# Patient Record
Sex: FEMALE | Race: BLACK OR AFRICAN AMERICAN | Employment: FULL TIME | URBAN - METROPOLITAN AREA
[De-identification: names, ages, dates, MRNs, and addresses within clinical notes are randomized per-mention and may not be internally consistent; named-entity substitution may affect disease eponyms.]

---

## 2022-08-28 ENCOUNTER — HOSPITAL ENCOUNTER (EMERGENCY)
Age: 47
Discharge: HOME | End: 2022-08-28
Attending: EMERGENCY MEDICINE | Admitting: EMERGENCY MEDICINE
Payer: MEDICAID

## 2022-08-28 VITALS
SYSTOLIC BLOOD PRESSURE: 120 MMHG | HEIGHT: 69 IN | OXYGEN SATURATION: 99 % | RESPIRATION RATE: 18 BRPM | WEIGHT: 150 LBS | TEMPERATURE: 98 F | DIASTOLIC BLOOD PRESSURE: 74 MMHG | BODY MASS INDEX: 22.22 KG/M2 | HEART RATE: 75 BPM

## 2022-08-28 DIAGNOSIS — Z20.2 POSSIBLE EXPOSURE TO STD: Primary | ICD-10-CM

## 2022-08-28 PROCEDURE — 99283 EMERGENCY DEPT VISIT LOW MDM: CPT | Performed by: EMERGENCY MEDICINE

## 2022-08-28 PROCEDURE — 87591 N.GONORRHOEAE DNA AMP PROB: CPT

## 2022-08-28 ASSESSMENT — ENCOUNTER SYMPTOMS
SHORTNESS OF BREATH: 0
ABDOMINAL PAIN: 0

## 2022-08-28 ASSESSMENT — PAIN - FUNCTIONAL ASSESSMENT: PAIN_FUNCTIONAL_ASSESSMENT: NONE - DENIES PAIN

## 2022-08-28 NOTE — ED PROVIDER NOTES
[facility] Emergency Department Provider Note                   PCP:                None Provider               Age: 56 y.o. Sex: female       ICD-10-CM    1. Possible exposure to STD  Z20.2           DISPOSITION Decision To Discharge 08/28/2022 04:24:20 PM        MDM  Number of Diagnoses or Management Options  Possible exposure to STD: new, needed workup  Diagnosis management comments: Well-appearing 79-year-old female who presents to the emergency department today due to concern for possible STD. She states that she had a bump on her genital region but is now resolved. Discussed the possibility of syphilis as she states it was painless. Patient states she does not believe it was syphilis. She is agreeable to have her urine sent for gonorrhea and chlamydia today but denies any current symptoms. She states she was concerned for monkey pox. Discussed the signs and symptoms of monkey pox. Patient had no prodrome of symptoms. She states the bump that she had on her genital region is resolved at this time. Encouraged follow-up with the [facility] for testing of other STDs if she has further concerns. She did not wish to be treated for anything today. I have discussed the results of all labs, procedures, radiographs, and/or treatments with the patient and available family members. Treatment plan is agreed upon by the patient and the patient is ready for discharge. Questions about treatment in the ED and differential diagnosis of presenting condition were answered. Patient was given verbal discharge instructions including, but not limited to, importance of returning to the emergency department for any concern of worsening or continued symptoms.          Amount and/or Complexity of Data Reviewed  Clinical lab tests: ordered    Risk of Complications, Morbidity, and/or Mortality  Presenting problems: low  Diagnostic procedures: low  Management options: low    Patient Progress  Patient progress: improved       Orders Placed This Encounter   Procedures    C.trachomatis N.gonorrhoeae DNA        Medications - No data to display    There are no discharge medications for this patient. Caron Arias is a 56 y.o. female who presents to the Emergency Department with chief complaint of    Chief Complaint   Patient presents with    Cough     Patient presents complaining of cough, congestion, post-nasal drainage and rash (rash is resolved). Patient verbalizes concerns for monkey pox. No rash present at this time. 31-year-old female who presents emergency department today with complaint of a rash on her neck that has since resolved. She also states she had developed a sore in her pubic hair but it is now resolved as well. She denies any vaginal discharge, vaginal bleeding, dysuria, hematuria, fever, chills, pelvic pain, abdominal pain, nausea, vomiting, diarrhea, chest pain, or shortness of breath today. The history is provided by the patient. Review of Systems   Constitutional:  Negative for fever. Respiratory:  Negative for shortness of breath. Cardiovascular:  Negative for chest pain. Gastrointestinal:  Negative for abdominal pain. Genitourinary:  Positive for genital sores. Negative for dysuria, vaginal discharge and vaginal pain. All other systems reviewed and are negative. No past medical history on file. No past surgical history on file. No family history on file. Social History     Socioeconomic History    Marital status:          Patient has no known allergies. There are no discharge medications for this patient. Vitals signs and nursing note reviewed. No data found. Physical Exam  Vitals and nursing note reviewed. Constitutional:       General: She is not in acute distress. Appearance: Normal appearance. She is well-developed. She is not ill-appearing, toxic-appearing or diaphoretic.    HENT: Head: Normocephalic and atraumatic. Mouth/Throat:      Mouth: Mucous membranes are moist.   Eyes:      General: No scleral icterus. Extraocular Movements: Extraocular movements intact. Cardiovascular:      Rate and Rhythm: Normal rate. Pulmonary:      Effort: Pulmonary effort is normal. No respiratory distress. Abdominal:      General: Abdomen is flat. There is no distension. Skin:     General: Skin is warm and dry. Neurological:      General: No focal deficit present. Mental Status: She is alert and oriented to person, place, and time. Psychiatric:         Mood and Affect: Mood normal.         Behavior: Behavior normal.        Procedures      [unfilled]     No orders to display                          Voice dictation software was used during the making of this note. This software is not perfect and grammatical and other typographical errors may be present. This note has not been completely proofread for errors.        ANNE-MARIE Pedraza - [place]  08/29/22 1819

## 2022-08-28 NOTE — ED TRIAGE NOTES
Patient presents complaining of cough, congestion, post-nasal drainage and rash (rash is resolved). Patient verbalizes concerns for monkey pox. No rash present at this time.

## 2022-08-28 NOTE — ED NOTES
I have reviewed discharge instructions with the patient. The patient verbalized understanding. Patient left ED via Discharge Method: ambulatory to Home by self    Opportunity for questions and clarification provided. Patient given 0 scripts. To continue your aftercare when you leave the hospital, you may receive an automated call from our care team to check in on how you are doing. This is a free service and part of our promise to provide the best care and service to meet your aftercare needs.  If you have questions, or wish to unsubscribe from this service please call [phone]. Thank you for Choosing our [facility] Emergency Department.        Destini Hampton RN  08/28/22 1822

## 2022-08-28 NOTE — DISCHARGE INSTRUCTIONS
As we discussed, your symptoms are not consistent with monkey pox. The [facility] can test for all STDs at no cost to you. Please call them to set up an appointment to be checked. Return to the emergency department for any emergent conditions.

## 2022-09-01 LAB
C TRACH RRNA SPEC QL NAA+PROBE: NEGATIVE
N GONORRHOEA RRNA SPEC QL NAA+PROBE: NEGATIVE
SPECIMEN SOURCE: NORMAL